# Patient Record
Sex: FEMALE | URBAN - METROPOLITAN AREA
[De-identification: names, ages, dates, MRNs, and addresses within clinical notes are randomized per-mention and may not be internally consistent; named-entity substitution may affect disease eponyms.]

---

## 2022-11-29 ENCOUNTER — OFFICE VISIT (OUTPATIENT)
Dept: URBAN - METROPOLITAN AREA CLINIC 72 | Facility: CLINIC | Age: 72
End: 2022-11-29
Payer: COMMERCIAL

## 2022-11-29 DIAGNOSIS — H43.813 VITREOUS DEGENERATION, BILATERAL: ICD-10-CM

## 2022-11-29 DIAGNOSIS — H52.4 PRESBYOPIA: ICD-10-CM

## 2022-11-29 DIAGNOSIS — H25.813 COMBINED FORMS OF AGE-RELATED CATARACT, BILATERAL: Primary | ICD-10-CM

## 2022-11-29 DIAGNOSIS — D31.31 BENIGN NEOPLASM OF RIGHT CHOROID: ICD-10-CM

## 2022-11-29 PROCEDURE — 99203 OFFICE O/P NEW LOW 30 MIN: CPT | Performed by: OPTOMETRIST

## 2022-11-29 ASSESSMENT — VISUAL ACUITY
OD: 20/20
OS: 20/20

## 2022-11-29 ASSESSMENT — INTRAOCULAR PRESSURE
OD: 11
OS: 12

## 2022-11-29 NOTE — IMPRESSION/PLAN
Impression: Benign neoplasm of right choroid: D31.31. optos ordered and done today Nevus >.5 DD temporal to disc OD Plan: Discussed DX in detail with pt Explained that a nevus is like a freckle in the eye, and like a freckle on the skin we monitor for changes and abnormalities, though risk of nevus turning into melanoma is less than 1%, but the earlier we diagnose the better the prognoses. Do not recommend any treatment at this time. Due to being close to the optic disc recommend monitoring yearly DFE and optos.  Pt understands